# Patient Record
Sex: MALE | Race: OTHER | Employment: FULL TIME | URBAN - METROPOLITAN AREA
[De-identification: names, ages, dates, MRNs, and addresses within clinical notes are randomized per-mention and may not be internally consistent; named-entity substitution may affect disease eponyms.]

---

## 2022-07-19 ENCOUNTER — HOSPITAL ENCOUNTER (EMERGENCY)
Age: 37
Discharge: HOME | End: 2022-07-19
Attending: STUDENT IN AN ORGANIZED HEALTH CARE EDUCATION/TRAINING PROGRAM

## 2022-07-19 ENCOUNTER — APPOINTMENT (OUTPATIENT)
Dept: GENERAL RADIOLOGY | Age: 37
End: 2022-07-19
Attending: NURSE PRACTITIONER

## 2022-07-19 VITALS
OXYGEN SATURATION: 99 % | HEART RATE: 59 BPM | RESPIRATION RATE: 18 BRPM | DIASTOLIC BLOOD PRESSURE: 90 MMHG | TEMPERATURE: 97.9 F | SYSTOLIC BLOOD PRESSURE: 138 MMHG

## 2022-07-19 DIAGNOSIS — S92.521A CLOSED DISPLACED FRACTURE OF MIDDLE PHALANX OF LESSER TOE OF RIGHT FOOT, INITIAL ENCOUNTER: Primary | ICD-10-CM

## 2022-07-19 PROCEDURE — 74011250637 HC RX REV CODE- 250/637: Performed by: NURSE PRACTITIONER

## 2022-07-19 PROCEDURE — 73630 X-RAY EXAM OF FOOT: CPT

## 2022-07-19 PROCEDURE — 99283 EMERGENCY DEPT VISIT LOW MDM: CPT

## 2022-07-19 RX ORDER — IBUPROFEN 400 MG/1
800 TABLET ORAL
Status: COMPLETED | OUTPATIENT
Start: 2022-07-19 | End: 2022-07-19

## 2022-07-19 RX ORDER — ACETAMINOPHEN 325 MG/1
650 TABLET ORAL
Qty: 20 TABLET | Refills: 0 | Status: SHIPPED | OUTPATIENT
Start: 2022-07-19

## 2022-07-19 RX ORDER — IBUPROFEN 800 MG/1
800 TABLET ORAL
Qty: 20 TABLET | Refills: 0 | Status: SHIPPED | OUTPATIENT
Start: 2022-07-19 | End: 2022-07-26

## 2022-07-19 RX ADMIN — IBUPROFEN 800 MG: 400 TABLET, FILM COATED ORAL at 20:05

## 2022-07-19 NOTE — ED PROVIDER NOTES
JESÚS Coon is a 31 y.o. male without any PMhx who presents ambulatory to [facility] ED with cc of R foot pain. Patient reports that he was playing soccer yesterday when he hit his right foot. He subsequently had swelling to the second through fourth toes with associated bruising. He states increased pain with weightbearing on his right foot. Denies any other injuries. Has taken Tylenol WNR. Denies F/C, N/V/D, cough, congestion, CP, SOB. Denies tobacco/ ETOH/ substance abuse. PCP: None    There are no other complaints, changes or physical findings at this time. History reviewed. No pertinent past medical history. No past surgical history on file. History reviewed. No pertinent family history. Social History     Socioeconomic History    Marital status: SINGLE     Spouse name: Not on file    Number of children: Not on file    Years of education: Not on file    Highest education level: Not on file   Occupational History    Not on file   Tobacco Use    Smoking status: Never Smoker    Smokeless tobacco: Never Used   Substance and Sexual Activity    Alcohol use: No    Drug use: No    Sexual activity: Not on file   Other Topics Concern    Not on file   Social History Narrative    Not on file     Social Determinants of Health     Financial Resource Strain:     Difficulty of Paying Living Expenses: Not on file   Food Insecurity:     Worried About Running Out of Food in the Last Year: Not on file    Magdi of Food in the Last Year: Not on file   Transportation Needs:     Lack of Transportation (Medical): Not on file    Lack of Transportation (Non-Medical):  Not on file   Physical Activity:     Days of Exercise per Week: Not on file    Minutes of Exercise per Session: Not on file   Stress:     Feeling of Stress : Not on file   Social Connections:     Frequency of Communication with Friends and Family: Not on file    Frequency of Social Gatherings with Friends and Family: Not on file    Attends Yazidism Services: Not on file    Active Member of Clubs or Organizations: Not on file    Attends Club or Organization Meetings: Not on file    Marital Status: Not on file   Intimate Partner Violence:     Fear of Current or Ex-Partner: Not on file    Emotionally Abused: Not on file    Physically Abused: Not on file    Sexually Abused: Not on file   Housing Stability:     Unable to Pay for Housing in the Last Year: Not on file    Number of [place] in the Last Year: Not on file    Unstable Housing in the Last Year: Not on file         ALLERGIES: Patient has no known allergies. Review of Systems   Constitutional: Negative for activity change, appetite change, chills and fever. HENT: Negative for congestion, rhinorrhea and sore throat. Eyes: Negative for visual disturbance. Respiratory: Negative for cough and shortness of breath. Cardiovascular: Negative for chest pain. Gastrointestinal: Negative for abdominal pain, diarrhea, nausea and vomiting. Genitourinary: Negative for dysuria, flank pain, frequency and urgency. Musculoskeletal: Positive for arthralgias and joint swelling. Negative for myalgias and neck pain. Skin: Positive for color change. Negative for rash. Neurological: Negative for dizziness, speech difficulty, weakness and headaches. Psychiatric/Behavioral: Negative for agitation, behavioral problems and confusion. All other systems reviewed and are negative. Vitals:    07/19/22 1750   BP: (!) 138/90   Pulse: (!) 59   Resp: 18   Temp: 97.9 °F (36.6 °C)   SpO2: 99%            Physical Exam  Vitals and nursing note reviewed. Constitutional:       General: He is not in acute distress. Appearance: He is well-developed. HENT:      Head: Normocephalic and atraumatic.       Right Ear: External ear normal.      Left Ear: External ear normal.      Nose: Nose normal.   Eyes:      Conjunctiva/sclera: Conjunctivae normal.      Pupils: Pupils are equal, round, and reactive to light. Cardiovascular:      Rate and Rhythm: Normal rate and regular rhythm. Heart sounds: Normal heart sounds. Pulmonary:      Effort: Pulmonary effort is normal.      Breath sounds: Normal breath sounds. Musculoskeletal:         General: Swelling, tenderness and signs of injury present. Cervical back: Normal range of motion and neck supple. Comments: MSK assessment of 2nd through 4th toes on R foot   No significant deformity, bruising present    Skin:     General: Skin is warm and dry. Neurological:      Mental Status: He is alert and oriented to person, place, and time. Psychiatric:         Behavior: Behavior normal.         Thought Content: Thought content normal.         Judgment: Judgment normal.          MDM  Number of Diagnoses or Management Options  Closed displaced fracture of middle phalanx of lesser toe of right foot, initial encounter  Diagnosis management comments: DDx: fx, contusion, sprain     Patient presents with concern for possible fracture to the toes on his right foot after playing soccer yesterday. Possible displaced fracture versus dislocation of the third toe on the right foot. No obvious dislocation on exam findings, significant swelling present. Attempted reduction of toe, was unable to do so. Deferred ongoing management to podiatry, encourage patient to weight-bear only as tolerated, educated on RICE and pain management, call podiatry in the morning. Reasons to return to the ER were provided. Amount and/or Complexity of Data Reviewed  Tests in the radiology section of CPT®: ordered and reviewed  Review and summarize past medical records: yes           Procedures    LABORATORY TESTS:  No results found for this or any previous visit (from the past 12 hour(s)). IMAGING RESULTS:  XR FOOT RT MIN 3 V   Final Result   Possible dislocation of the right third PIP joint. Recommend   clinical correlation. MEDICATIONS GIVEN:  Medications   ibuprofen (MOTRIN) tablet 800 mg (800 mg Oral Given 7/19/22 2005)       IMPRESSION:  1. Closed displaced fracture of middle phalanx of lesser toe of right foot, initial encounter        PLAN:  1. Discharge Medication List as of 7/19/2022  7:05 PM      START taking these medications    Details   ibuprofen (MOTRIN) 800 mg tablet Take 1 Tablet by mouth every six (6) hours as needed for Pain for up to 7 days. , Print, Disp-20 Tablet, R-0      acetaminophen (TYLENOL) 325 mg tablet Take 2 Tablets by mouth every four (4) hours as needed for Pain., Print, Disp-20 Tablet, R-0           2. Follow-up Information     Follow up With Specialties Details Why Contact Info    [address] [facility] Emergency Medicine Go to  As needed, If symptoms worsen [address] [place] [address]  [phone]    Gissel Ceron DPM Foot and Ankle Surgery Schedule an appointment as soon as possible for a visit  Call tomorrow morning to set up an appointment [address]   [address]  [address]  [phone]          3.  Return to ED if worse

## 2022-07-19 NOTE — Clinical Note
[address]  [address] [facility] [address]  [phone]    Work/School Note    Date: 7/19/2022    To Whom It May concern:    Bi Renteria was seen and treated today in the emergency room by the following provider(s):  Attending Provider: Sincere Krueger DO  Nurse Practitioner: Romero Howe NP. Bi Renteria is excused from work/school on 7/19/2022 through 7/21/2022. He is medically clear to return to work/school on 7/22/2022.          Sincerely,          Angie Canada NP

## 2022-07-19 NOTE — DISCHARGE INSTRUCTIONS
Weight bear only as tolerated, do not play any sports or exercise until you see a foot specialist. Wear shoe to help offload weight on your toes. Take Motrin alternating with Tylenol for pain. Ice your foot and elevate. Call podiatry in the morning for follow up. Please return to the ER for any worsening or worrisome symptoms.